# Patient Record
(demographics unavailable — no encounter records)

---

## 2018-01-20 NOTE — EDPHY
H & P


Stated Complaint: lump in l abd for 1 month


Time Seen by Provider: 01/20/18 14:47


HPI/ROS: 





CHIEF COMPLAINT:  Abdominal mass





HISTORY OF PRESENT ILLNESS:  The patient is a 44-year-old recovering alcoholic 

who comes to the ER complaining of a lower abdominal mass that she 1st noticed 

about a month ago.  She states that her acupuncturist noticed it 1st and then 

the people at rehab also recommended she come to the ER to have it evaluated.  

She states that it is not painful.  Her periods have not been irregular.  She 

is menstruating now.  She has not had any urinary complaints.  No trauma. No 

fever.  She denies risk of pregnancy.








REVIEW OF SYSTEMS:


Constitutional:  denies: chills, fever, recent illness, recent injury


EENTM: denies: blurred vision, double vision, nose congestion


Respiratory: denies: cough, shortness of breath


Cardiac: denies: chest pain, irregular heart rate, lightheadedness, palpitations


Gastrointestinal/Abdominal:  See HPI


Genitourinary: denies: dysuria, frequency, hematuria, pain


Musculoskeletal: denies: joint pain, muscle pain


Skin: denies: lesions, rash, jaundice, bruising


Neurological: denies: headache, numbness, paresthesia, tingling, dizziness, 

weakness


Hematologic/Lymphatic: denies: blood clots, easy bleeding, easy bruising


Immunologic/allergic: denies: HIV/AIDS, transplant








EXAM:


GENERAL:  Well-appearing, well-nourished and in no acute distress.


HEAD:  Atraumatic, normocephalic.


EYES:  Pupils equal round and reactive to light, extraocular movements intact, 

sclera anicteric, conjunctiva are normal.


ENT:  TMs normal, nares patent, oropharynx clear without exudates.  Moist 

mucous membranes.


NECK:  Normal range of motion, supple without lymphadenopathy or JVD.


LUNGS:  Breath sounds clear to auscultation bilaterally and equal.  No wheezes 

rales or rhonchi.


HEART:  Regular rate and rhythm without murmurs, rubs or gallops.


ABDOMEN:  Large firm uterus, nontender, midline, below the umbilicus


BACK:  No CVA tenderness, no spinal tenderness, step-offs or deformities


EXTREMITIES:  Normal range of motion, no pitting or edema.  No clubbing or 

cyanosis.


NEUROLOGICAL:  Cranial nerves II through XII grossly intact.  Normal speech, 

normal gait.  5/5 strength, normal movement in all extremities, normal sensation


PSYCH:  Normal mood, normal affect.


SKIN:  Warm, dry, normal turgor, no visible rashes or lesions.








Source: Patient


Exam Limitations: No limitations





- Personal History


LMP (Females 10-55): Now


Current Tetanus Diphtheria and Acellular Pertussis (TDAP): Yes





- Medical/Surgical History


Hx Asthma: No


Hx Chronic Respiratory Disease: No


Hx Diabetes: No


Hx Cardiac Disease: No


Hx Renal Disease: No


Hx Cirrhosis: No


Hx Alcoholism: No


Other PMH: wisdom teeth removed





- Family History


Significant Family History: No pertinent family hx





- Social History


Smoking Status: Never smoked


Alcohol Use: Sober


Drug Use: None


Constitutional: 


 Initial Vital Signs











Temperature (C)  36.6 C   01/20/18 14:24


 


Heart Rate  76   01/20/18 14:24


 


Respiratory Rate  16   01/20/18 14:24


 


Blood Pressure  125/77 H  01/20/18 14:24


 


O2 Sat (%)  93   01/20/18 14:24








 











O2 Delivery Mode               Room Air














Allergies/Adverse Reactions: 


 





latex Allergy (Verified 01/20/18 14:20)


 


Sulfa (Sulfonamide Antibiotics) Allergy (Verified 01/20/18 14:20)


 








Home Medications: 














 Medication  Instructions  Recorded


 


GABAPENTIN  01/20/18


 


Keppra  01/20/18


 


Pepcid  01/20/18


 


Prozac    01/20/18


 


Thiamine HCl  01/20/18














Medical Decision Making





- Diagnostics


Imaging Results: 


 Imaging Impressions





Pelvic/Renal Ultrasound  01/20/18 14:52


Impression: Prominent fibroid uterus. Other findings as detailed above.


If this patient is considered a candidate for fibroid embolization pelvic MRI 

could be considered for preoperative evaluation.


 


Results called and discussed with PRASHANTH GERBER M.D. on 1/20/2018 at 16:02











Imaging: Discussed imaging studies w/ On call Radiologist


ED Course/Re-evaluation: 





We discussed the ultrasound results.  The patient's abdomen is nontender.  We 

discussed follow-up.  Patient and family agree with this plan.  They declined 

further workup or imaging at this time.


Differential Diagnosis: 





Partial list of the Differential diagnosis considered include but were not 

limited to;  pregnancy, fibroid and although unlikely based on the history and 

physical exam, I also considered ovarian cyst, torsion, cancer.  I discussed 

these differential diagnoses and the plan with the patient as well as the usual 

and expected course.  The patient understands that the diagnosis is provisional 

and that in medicine we are not always correct and that further workup is often 

warranted.  Usual and customary warnings were given.  All of the patient's 

questions were answered.  The patient was instructed to return to the emergency 

department should the symptoms at all worsen or return, otherwise to followup 

with the physician as we discussed.





Departure





- Departure


Disposition: Home, Routine, Self-Care


Clinical Impression: 


Fibroid, uterine


Qualifiers:


 Uterine leiomyoma location: unspecified location Qualified Code(s): D25.9 - 

Leiomyoma of uterus, unspecified





Condition: Fair


Instructions:  Hysterectomy (DC)


Referrals: 


ZAKIYA CALLES [Other] - As per Instructions


Joshua-Swati Jha MD [Medical Doctor] - As per Instructions